# Patient Record
Sex: FEMALE | Race: WHITE | Employment: OTHER | URBAN - METROPOLITAN AREA
[De-identification: names, ages, dates, MRNs, and addresses within clinical notes are randomized per-mention and may not be internally consistent; named-entity substitution may affect disease eponyms.]

---

## 2022-04-17 ENCOUNTER — APPOINTMENT (OUTPATIENT)
Dept: ULTRASOUND IMAGING | Age: 32
End: 2022-04-17
Payer: MEDICAID

## 2022-04-17 ENCOUNTER — HOSPITAL ENCOUNTER (EMERGENCY)
Age: 32
Discharge: HOME | End: 2022-04-17
Attending: EMERGENCY MEDICINE
Payer: MEDICAID

## 2022-04-17 ENCOUNTER — APPOINTMENT (OUTPATIENT)
Dept: CT IMAGING | Age: 32
End: 2022-04-17
Payer: MEDICAID

## 2022-04-17 VITALS
WEIGHT: 114 LBS | HEART RATE: 106 BPM | SYSTOLIC BLOOD PRESSURE: 104 MMHG | OXYGEN SATURATION: 100 % | TEMPERATURE: 98.5 F | DIASTOLIC BLOOD PRESSURE: 76 MMHG | RESPIRATION RATE: 18 BRPM | BODY MASS INDEX: 18.99 KG/M2 | HEIGHT: 65 IN

## 2022-04-17 DIAGNOSIS — R10.30 LOWER ABDOMINAL PAIN: Primary | ICD-10-CM

## 2022-04-17 LAB
A/G RATIO: 2.5 (ref 1.1–2.2)
ALBUMIN SERPL-MCNC: 4.8 G/DL (ref 3.4–5)
ALP BLD-CCNC: 74 U/L (ref 40–129)
ALT SERPL-CCNC: 11 U/L (ref 10–40)
ANION GAP SERPL CALCULATED.3IONS-SCNC: 11 MMOL/L (ref 3–16)
AST SERPL-CCNC: 12 U/L (ref 15–37)
BACTERIA: ABNORMAL /HPF
BASOPHILS ABSOLUTE: 0 K/UL (ref 0–0.2)
BASOPHILS RELATIVE PERCENT: 0.2 %
BILIRUB SERPL-MCNC: 0.4 MG/DL (ref 0–1)
BILIRUBIN URINE: NEGATIVE
BLOOD, URINE: NEGATIVE
BUN BLDV-MCNC: 9 MG/DL (ref 7–20)
CALCIUM SERPL-MCNC: 9.1 MG/DL (ref 8.3–10.6)
CHLORIDE BLD-SCNC: 103 MMOL/L (ref 99–110)
CLARITY: CLEAR
CO2: 24 MMOL/L (ref 21–32)
COLOR: YELLOW
CREAT SERPL-MCNC: 0.6 MG/DL (ref 0.6–1.1)
EOSINOPHILS ABSOLUTE: 0 K/UL (ref 0–0.6)
EOSINOPHILS RELATIVE PERCENT: 0.5 %
EPITHELIAL CELLS, UA: ABNORMAL /HPF (ref 0–5)
GFR AFRICAN AMERICAN: >60
GFR NON-AFRICAN AMERICAN: >60
GLUCOSE BLD-MCNC: 112 MG/DL (ref 70–99)
GLUCOSE URINE: NEGATIVE MG/DL
HCG(URINE) PREGNANCY TEST: NEGATIVE
HCT VFR BLD CALC: 40.5 % (ref 36–48)
HEMOGLOBIN: 13.5 G/DL (ref 12–16)
INFLUENZA A: NOT DETECTED
INFLUENZA B: NOT DETECTED
KETONES, URINE: NEGATIVE MG/DL
LACTIC ACID, SEPSIS: 0.8 MMOL/L (ref 0.4–1.9)
LEUKOCYTE ESTERASE, URINE: NEGATIVE
LYMPHOCYTES ABSOLUTE: 0.3 K/UL (ref 1–5.1)
LYMPHOCYTES RELATIVE PERCENT: 5.4 %
MCH RBC QN AUTO: 27.7 PG (ref 26–34)
MCHC RBC AUTO-ENTMCNC: 33.2 G/DL (ref 31–36)
MCV RBC AUTO: 83.5 FL (ref 80–100)
MICROSCOPIC EXAMINATION: NORMAL
MONOCYTES ABSOLUTE: 0.1 K/UL (ref 0–1.3)
MONOCYTES RELATIVE PERCENT: 2.4 %
NEUTROPHILS ABSOLUTE: 4.9 K/UL (ref 1.7–7.7)
NEUTROPHILS RELATIVE PERCENT: 91.5 %
NITRITE, URINE: NEGATIVE
PDW BLD-RTO: 13.3 % (ref 12.4–15.4)
PH UA: 6.5 (ref 5–8)
PLATELET # BLD: 150 K/UL (ref 135–450)
PMV BLD AUTO: 7.8 FL (ref 5–10.5)
POTASSIUM REFLEX MAGNESIUM: 4.2 MMOL/L (ref 3.5–5.1)
PROTEIN UA: NEGATIVE MG/DL
RBC # BLD: 4.85 M/UL (ref 4–5.2)
RBC UA: ABNORMAL /HPF (ref 0–4)
SARS-COV-2 RNA, RT PCR: NOT DETECTED
SODIUM BLD-SCNC: 138 MMOL/L (ref 136–145)
SPECIFIC GRAVITY UA: <=1.005 (ref 1–1.03)
TOTAL PROTEIN: 6.7 G/DL (ref 6.4–8.2)
URINE REFLEX TO CULTURE: NORMAL
URINE TYPE: ABNORMAL
URINE TYPE: NORMAL
UROBILINOGEN, URINE: 0.2 E.U./DL
WBC # BLD: 5.4 K/UL (ref 4–11)
WBC UA: ABNORMAL /HPF (ref 0–5)

## 2022-04-17 PROCEDURE — 74177 CT ABD & PELVIS W/CONTRAST: CPT

## 2022-04-17 PROCEDURE — 85025 COMPLETE CBC W/AUTO DIFF WBC: CPT

## 2022-04-17 PROCEDURE — 87636 SARSCOV2 & INF A&B AMP PRB: CPT

## 2022-04-17 PROCEDURE — 81001 URINALYSIS AUTO W/SCOPE: CPT

## 2022-04-17 PROCEDURE — 2580000003 HC RX 258: Performed by: EMERGENCY MEDICINE

## 2022-04-17 PROCEDURE — 83605 ASSAY OF LACTIC ACID: CPT

## 2022-04-17 PROCEDURE — 99235 HOSP IP/OBS SAME DATE MOD 70: CPT | Performed by: INTERNAL MEDICINE

## 2022-04-17 PROCEDURE — 76830 TRANSVAGINAL US NON-OB: CPT

## 2022-04-17 PROCEDURE — 84703 CHORIONIC GONADOTROPIN ASSAY: CPT

## 2022-04-17 PROCEDURE — 6360000002 HC RX W HCPCS: Performed by: EMERGENCY MEDICINE

## 2022-04-17 PROCEDURE — 87077 CULTURE AEROBIC IDENTIFY: CPT

## 2022-04-17 PROCEDURE — 96375 TX/PRO/DX INJ NEW DRUG ADDON: CPT

## 2022-04-17 PROCEDURE — 87186 SC STD MICRODIL/AGAR DIL: CPT

## 2022-04-17 PROCEDURE — 36415 COLL VENOUS BLD VENIPUNCTURE: CPT

## 2022-04-17 PROCEDURE — 80053 COMPREHEN METABOLIC PANEL: CPT

## 2022-04-17 PROCEDURE — 96374 THER/PROPH/DIAG INJ IV PUSH: CPT

## 2022-04-17 PROCEDURE — 99285 EMERGENCY DEPT VISIT HI MDM: CPT

## 2022-04-17 PROCEDURE — 6360000004 HC RX CONTRAST MEDICATION: Performed by: EMERGENCY MEDICINE

## 2022-04-17 PROCEDURE — 87086 URINE CULTURE/COLONY COUNT: CPT

## 2022-04-17 RX ORDER — GABAPENTIN 100 MG/1
100 CAPSULE ORAL 3 TIMES DAILY
COMMUNITY

## 2022-04-17 RX ORDER — 0.9 % SODIUM CHLORIDE 0.9 %
1000 INTRAVENOUS SOLUTION INTRAVENOUS ONCE
Status: COMPLETED | OUTPATIENT
Start: 2022-04-17 | End: 2022-04-17

## 2022-04-17 RX ORDER — CYCLOBENZAPRINE HCL 10 MG
10 TABLET ORAL 3 TIMES DAILY PRN
COMMUNITY

## 2022-04-17 RX ORDER — BISACODYL 5 MG
10 TABLET, DELAYED RELEASE (ENTERIC COATED) ORAL DAILY PRN
Qty: 30 TABLET | Refills: 0 | Status: SHIPPED | OUTPATIENT
Start: 2022-04-17

## 2022-04-17 RX ORDER — POLYETHYLENE GLYCOL 3350 17 G/17G
17 POWDER, FOR SOLUTION ORAL 2 TIMES DAILY
Qty: 1530 G | Refills: 1 | Status: SHIPPED | OUTPATIENT
Start: 2022-04-17 | End: 2022-04-24

## 2022-04-17 RX ORDER — DEXMETHYLPHENIDATE HYDROCHLORIDE 5 MG/1
5 TABLET ORAL 2 TIMES DAILY
COMMUNITY

## 2022-04-17 RX ORDER — DIPHENHYDRAMINE HYDROCHLORIDE 50 MG/ML
50 INJECTION INTRAMUSCULAR; INTRAVENOUS ONCE
Status: COMPLETED | OUTPATIENT
Start: 2022-04-17 | End: 2022-04-17

## 2022-04-17 RX ORDER — METOCLOPRAMIDE HYDROCHLORIDE 5 MG/ML
10 INJECTION INTRAMUSCULAR; INTRAVENOUS ONCE
Status: COMPLETED | OUTPATIENT
Start: 2022-04-17 | End: 2022-04-17

## 2022-04-17 RX ORDER — DOCUSATE SODIUM 100 MG/1
100 CAPSULE, LIQUID FILLED ORAL 2 TIMES DAILY
Qty: 60 CAPSULE | Refills: 0 | Status: SHIPPED | OUTPATIENT
Start: 2022-04-17 | End: 2022-04-24

## 2022-04-17 RX ORDER — ONDANSETRON 2 MG/ML
4 INJECTION INTRAMUSCULAR; INTRAVENOUS EVERY 6 HOURS PRN
Status: DISCONTINUED | OUTPATIENT
Start: 2022-04-17 | End: 2022-04-17 | Stop reason: HOSPADM

## 2022-04-17 RX ORDER — FENTANYL CITRATE 50 UG/ML
50 INJECTION, SOLUTION INTRAMUSCULAR; INTRAVENOUS
Status: DISCONTINUED | OUTPATIENT
Start: 2022-04-17 | End: 2022-04-17 | Stop reason: HOSPADM

## 2022-04-17 RX ORDER — TRAMADOL HYDROCHLORIDE 50 MG/1
50 TABLET ORAL EVERY 6 HOURS PRN
COMMUNITY

## 2022-04-17 RX ADMIN — ONDANSETRON HYDROCHLORIDE 4 MG: 2 INJECTION, SOLUTION INTRAMUSCULAR; INTRAVENOUS at 09:21

## 2022-04-17 RX ADMIN — HYDROMORPHONE HYDROCHLORIDE 0.5 MG: 1 INJECTION, SOLUTION INTRAMUSCULAR; INTRAVENOUS; SUBCUTANEOUS at 10:48

## 2022-04-17 RX ADMIN — SODIUM CHLORIDE 1000 ML: 9 INJECTION, SOLUTION INTRAVENOUS at 13:53

## 2022-04-17 RX ADMIN — METOCLOPRAMIDE 10 MG: 5 INJECTION, SOLUTION INTRAMUSCULAR; INTRAVENOUS at 10:48

## 2022-04-17 RX ADMIN — DIPHENHYDRAMINE HYDROCHLORIDE 50 MG: 50 INJECTION, SOLUTION INTRAMUSCULAR; INTRAVENOUS at 10:48

## 2022-04-17 RX ADMIN — SODIUM CHLORIDE 1000 ML: 9 INJECTION, SOLUTION INTRAVENOUS at 09:21

## 2022-04-17 RX ADMIN — IOPAMIDOL 75 ML: 755 INJECTION, SOLUTION INTRAVENOUS at 09:35

## 2022-04-17 ASSESSMENT — PAIN DESCRIPTION - PAIN TYPE: TYPE: ACUTE PAIN

## 2022-04-17 ASSESSMENT — PAIN SCALES - GENERAL
PAINLEVEL_OUTOF10: 4
PAINLEVEL_OUTOF10: 7

## 2022-04-17 NOTE — LETTER
[facility] was seen and treated in our emergency department on 4/17/2022. She may return to work on 04/19/2022. If you have any questions or concerns, please don't hesitate to call.       Fiorella Hendrix MD

## 2022-04-17 NOTE — ED PROVIDER NOTES
[address] ED      CHIEF COMPLAINT  Abdominal Pain (X1 DAY) and Flank Pain (X DAY)       HISTORY OF PRESENT ILLNESS  Chelsea Deliliah Fenton Severance is a 28 y.o. female with history of brain tumor, POTS, ADHD who is visiting from [place] who comes to the emergency department for evaluation of abdominal pain. Patient reports she started having right-sided flank pain radiating down starting yesterday. Says she contacted her PCP who recommended that she come in for urine. Says she did not have time to stop by before Taiwo Mckeon catch her flight. Says today, the pain got acutely worse. Reports having a odor in her urine. Denies having dysuria or urinary urgency. No blood in the urine. Says she did have blood before her. .  Not currently. Says she always feels nauseous and tried her home Zofran 8 mg ODT with no improvement of symptoms. Rates her pain as a 9-10 out of 10 sharp pain to the right abdomen. No other complaints, modifying factors or associated symptoms. I have reviewed the following from the nursing documentation. Past Medical History:   Diagnosis Date    ADHD (attention deficit hyperactivity disorder)     Anxiety     Asthma     Benign tumor of breast     Brain tumor ([facility] [address]     Depression     Heart murmur     POTS (postural orthostatic tachycardia syndrome)     Tumor, foot     left ankle    Unspecified breast disorder     tumor removed in Dec 2012     Past Surgical History:   Procedure Laterality Date    BREAST ENHANCEMENT SURGERY      BREAST SURGERY      2 cysts removed     SECTION      COLONOSCOPY      EXTERNAL AUDITORY CANAL RECONSTRUCTION      OTHER SURGICAL HISTORY      spinal tap    OTHER SURGICAL HISTORY      blood patch     UPPER GASTROINTESTINAL ENDOSCOPY      WISDOM TOOTH EXTRACTION       History reviewed. No pertinent family history.   Social History     Socioeconomic History    Marital status: Single     Spouse name: Not on file    Number of children: Not on file    Years of education: Not on file    Highest education level: Not on file   Occupational History    Not on file   Tobacco Use    Smoking status: Former Smoker     Types: Cigarettes     Quit date: 2016     Years since quittin.0    Smokeless tobacco: Never Used   Substance and Sexual Activity    Alcohol use: No     Comment: 2-3 times a year    Drug use: No    Sexual activity: Yes     Partners: Male   Other Topics Concern    Not on file   Social History Narrative    Not on file     Social Determinants of Health     Financial Resource Strain:     Difficulty of Paying Living Expenses: Not on file   Food Insecurity:     Worried About Running Out of Food in the Last Year: Not on file    Antionette of Food in the Last Year: Not on file   Transportation Needs:     Lack of Transportation (Medical): Not on file    Lack of Transportation (Non-Medical): Not on file   Physical Activity:     Days of Exercise per Week: Not on file    Minutes of Exercise per Session: Not on file   Stress:     Feeling of Stress : Not on file   Social Connections:     Frequency of Communication with Friends and Family: Not on file    Frequency of Social Gatherings with Friends and Family: Not on file    Attends Quaker Services: Not on file    Active Member of [address] [place] [address] [employer] or Organizations: Not on file    Attends Club or Organization Meetings: Not on file    Marital Status: Not on file   Intimate Partner Violence:     Fear of Current or Ex-Partner: Not on file    Emotionally Abused: Not on file    Physically Abused: Not on file    Sexually Abused: Not on file   Housing Stability:     Unable to Pay for Housing in the Last Year: Not on file    Number of [place] in the Last Year: Not on file    Unstable Housing in the Last Year: Not on file     No current facility-administered medications for this encounter.      Current Outpatient Medications   Medication Sig Dispense Refill    gabapentin (NEURONTIN) 100 MG capsule Take 100 mg by mouth 3 times daily.  traMADol (ULTRAM) 50 MG tablet Take 50 mg by mouth every 6 hours as needed for Pain.  cyclobenzaprine (FLEXERIL) 10 MG tablet Take 10 mg by mouth 3 times daily as needed for Muscle spasms      dexmethylphenidate (FOCALIN) 5 MG tablet Take 5 mg by mouth 2 times daily.  polyethylene glycol (GLYCOLAX) 17 GM/SCOOP powder Take 17 g by mouth 2 times daily for 7 days 1530 g 1    docusate sodium (COLACE) 100 MG capsule Take 1 capsule by mouth 2 times daily for 7 days 60 capsule 0    bisacodyl (BISACODYL) 5 MG EC tablet Take 2 tablets by mouth daily as needed for Constipation 30 tablet 0    amphetamine-dextroamphetamine (ADDERALL) 10 MG tablet Take 10 mg by mouth 2 times daily       Allergies   Allergen Reactions    Amoxicillin Hives    Penicillins      Severe hives       REVIEW OF SYSTEMS  10 systems reviewed, pertinent positives per HPI otherwise noted to be negative. PHYSICAL EXAM  /76   Pulse 106   Temp 98.5 °F (36.9 °C) (Oral)   Resp 18   Ht 5' 5\" (1.651 m)   Wt 114 lb (51.7 kg)   LMP 04/12/2022   SpO2 100%   BMI 18.97 kg/m²    GENERAL APPEARANCE: Awake and alert. In moderate distress due to pain. Laying in a fetal position. HENT: Normocephalic. Atraumatic. No facial droop. HEART/CHEST: Normotensive. Tachycardic to 117. LUNGS: Respirations unlabored. Speaking comfortably in full sentences. Clear to auscultation bilaterally. ABDOMEN: Soft, non-distended abdomen. tenderness to palpation over the RLQ. No guarding. No rebound. EXTREMITIES: No gross deformities. Moving all extremities. SKIN: Warm and dry. No acute rashes. NEUROLOGICAL: Alert and oriented. No gross facial drooping. Answering questions appropriately. Moving all extremities. PSYCHIATRIC: Pleasant. Normal mood and affect.     LABS  Results for orders placed or performed during the hospital encounter of 04/17/22   COVID-19 & Influenza Combo Specimen: Nasopharyngeal Swab   Result Value Ref Range    SARS-CoV-2 RNA, RT PCR NOT DETECTED NOT DETECTED    INFLUENZA A NOT DETECTED NOT DETECTED    INFLUENZA B NOT DETECTED NOT DETECTED   CBC with Auto Differential   Result Value Ref Range    WBC 5.4 4.0 - 11.0 K/uL    RBC 4.85 4.00 - 5.20 M/uL    Hemoglobin 13.5 12.0 - 16.0 g/dL    Hematocrit 40.5 36.0 - 48.0 %    MCV 83.5 80.0 - 100.0 fL    MCH 27.7 26.0 - 34.0 pg    MCHC 33.2 31.0 - 36.0 g/dL    RDW 13.3 12.4 - 15.4 %    Platelets 936 828 - 693 K/uL    MPV 7.8 5.0 - 10.5 fL    Neutrophils % 91.5 %    Lymphocytes % 5.4 %    Monocytes % 2.4 %    Eosinophils % 0.5 %    Basophils % 0.2 %    Neutrophils Absolute 4.9 1.7 - 7.7 K/uL    Lymphocytes Absolute 0.3 (L) 1.0 - 5.1 K/uL    Monocytes Absolute 0.1 0.0 - 1.3 K/uL    Eosinophils Absolute 0.0 0.0 - 0.6 K/uL    Basophils Absolute 0.0 0.0 - 0.2 K/uL   Comprehensive Metabolic Panel w/ Reflex to MG   Result Value Ref Range    Sodium 138 136 - 145 mmol/L    Potassium reflex Magnesium 4.2 3.5 - 5.1 mmol/L    Chloride 103 99 - 110 mmol/L    CO2 24 21 - 32 mmol/L    Anion Gap 11 3 - 16    Glucose 112 (H) 70 - 99 mg/dL    BUN 9 7 - 20 mg/dL    CREATININE 0.6 0.6 - 1.1 mg/dL    GFR Non-African American >60 >60    GFR African American >60 >60    Calcium 9.1 8.3 - 10.6 mg/dL    Total Protein 6.7 6.4 - 8.2 g/dL    Albumin 4.8 3.4 - 5.0 g/dL    Albumin/Globulin Ratio 2.5 (H) 1.1 - 2.2    Total Bilirubin 0.4 0.0 - 1.0 mg/dL    Alkaline Phosphatase 74 40 - 129 U/L    ALT 11 10 - 40 U/L    AST 12 (L) 15 - 37 U/L   Lactate, Sepsis   Result Value Ref Range    Lactic Acid, Sepsis 0.8 0.4 - 1.9 mmol/L   Urinalysis with Reflex to Culture    Specimen: Urine, clean catch   Result Value Ref Range    Color, UA Yellow Straw/Yellow    Clarity, UA Clear Clear    Glucose, Ur Negative Negative mg/dL    Bilirubin Urine Negative Negative    Ketones, Urine Negative Negative mg/dL    Specific Gravity, UA <=1.005 1.005 - 1.030    Blood, Urine Negative Negative    pH, UA 6.5 5.0 - 8.0    Protein, UA Negative Negative mg/dL    Urobilinogen, Urine 0.2 <2.0 E.U./dL    Nitrite, Urine Negative Negative    Leukocyte Esterase, Urine Negative Negative    Microscopic Examination Not Indicated     Urine Type NotGiven     Urine Reflex to Culture Not Indicated    Pregnancy, Urine   Result Value Ref Range    HCG(Urine) Pregnancy Test Negative Detects HCG level >20 MIU/mL   Microscopic Urinalysis   Result Value Ref Range    WBC, UA 0-2 0 - 5 /HPF    RBC, UA 0-2 0 - 4 /HPF    Epithelial Cells, UA 2-5 0 - 5 /HPF    Bacteria, UA 4+ (A) None Seen /HPF    Urine Type NotGiven        I have reviewed all labs for this visit. RADIOLOGY  US NON OB TRANSVAGINAL    Result Date: 4/17/2022  EXAMINATION: PELVIC ULTRASOUND 4/17/2022 TECHNIQUE: Endovaginal sonographic examination of the pelvis was performed with color Doppler imaging. COMPARISON: None HISTORY: ORDERING SYSTEM PROVIDED HISTORY: severe bilateral lower abdominal pain. assess for torsion TECHNOLOGIST PROVIDED HISTORY: Reason for exam:->severe bilateral lower abdominal pain. assess for torsion FINDINGS: Measurements: Uterus: 7.8 x 4.7 x 4 cm Endometrial stripe: 7 mm Right Ovary:2.7 x 1.2 x 1.6 cm Left Ovary: 2.8 x 1.6 x 1.6 cm Ultrasound Findings: Uterus: Uterus demonstrates normal myometrial echotexture. Note is made of a tiny 10 mm intramural echogenic fundal fibroid. Endometrial stripe: Endometrial stripe is within normal limits. Right Ovary: Right ovary is within normal limits. There is normal Doppler flow. Left Ovary:  Left ovary is within normal limits. There is normal Doppler flow. Free Fluid: A small amount of physiologic fluid is present within the cul-de-sac. No acute abnormality.  RECOMMENDATIONS: Unavailable     CT ABDOMEN PELVIS W IV CONTRAST Additional Contrast? None    Result Date: 4/17/2022  EXAM: CT Abdomen and Pelvis With Intravenous Contrast EXAM DATE/TIME: 4/17/2022 9:27 am CLINICAL HISTORY: ORDERING SYSTEM PROVIDED  right flank pain radiating down. also having left sided flank pain. TECHNOLOGIST PROVIDED HISTORY:  Reason for exam:->right flank pain radiating down. also having left sided flank pain. Additional Contrast?->None  Decision Support Exception - unselect if not a suspected or confirmed emergency medical condition->Emergency Medical Condition (MA) Reason for Exam: rt sided lower abd/pelvic pain    started late last night with vomiting and fever  Relevant Medical/Surgical History: no prior abd sx to note TECHNIQUE: Axial computed tomography images of the abdomen and pelvis with intravenous contrast.  This CT exam was performed using one or more of the following dose reduction techniques:  automated exposure control, adjustment of the mA and/or kV according to patient size, and/or use of iterative reconstruction technique. COMPARISON: No relevant prior studies available. FINDINGS: Lung bases:  No acute findings. No mass. No consolidation. ABDOMEN: Liver:  No acute findings. No mass. Gallbladder and bile ducts:  No acute findings. No calcified stones. No ductal dilation. Pancreas:  No acute findings. No mass. No ductal dilation. Spleen:  No acute findings. No splenomegaly. Adrenals:  No acute findings. No mass. Kidneys and ureters:  No acute findings. No hydronephrosis. Tiny bilateral renal cysts. Stomach and bowel:  Degree of right through proximal sigmoid colon constipation but otherwise nonspecific bowel gas pattern. No obstruction. No mucosal thickening. PELVIS: Appendix:  A definite appendix is not identified but no evidence of pericecal inflammation. Bladder:  No acute findings. No mass. Reproductive:  Unremarkable as visualized. ABDOMEN and PELVIS: Intraperitoneal space:  Small amount of right posterior pelvic fluid is noted. No free air. Bones/joints:  No acute fracture. No dislocation. Soft tissues:  No acute findings. Vasculature:  No acute findings.   No abdominal aortic aneurysm. Lymph nodes:  No acute findings. No enlarged lymph nodes. 1.  Small amount of right posterior pelvic fluid is noted. 2.  A definite appendix is not identified but no evidence of pericecal inflammation. 3.  Degree of right through proximal sigmoid colon constipation but otherwise nonspecific bowel gas pattern. ED COURSE/MDM  Patient seen and evaluated. At presentation, patient was awake, alert, afebrile, borderline hypotensive, tachycardic to 117, and satting 100% on room air. She had soft, nondistended abdomen with tenderness to palpation over the right lower abdomen. She had bilateral costovertebral tenderness. Differential diagnosis includes pyelonephritis, UTI, nephrolithiasis, acute pancreatitis, acute appendicitis, or others. Labs, CT abdomen pelvis, and urine obtained. She was given fentanyl and 1 L IV fluid bolus for symptoms. She was given IV Zofran. She reports having persistent nausea and pain after receiving fentanyl and Zofran. She was ordered Benadryl, Reglan, and Dilaudid. Patient continuing to have pain after fentanyl and Dilaudid. Discussed the results of the work-up including the ultrasound and CT scan. Due to the persistent pain, hospitalist consulted for admission for further evaluation and treatment. Second liter of IVF bolus ordered for the patient. Patient evaluated in the emergency department by the hospitalist and hospitalist please order for discharge for the patient. Pt was seen during the [place] 19 pandemic. Appropriate PPE worn by ME during patient encounters. Patient was cared for during a time with constrained hospital bed capacity with nationwide stress on resources and staffing.      During the patient's ED course, the patient was given:  Medications   0.9 % sodium chloride bolus (0 mLs IntraVENous Stopped 4/17/22 4792)   iopamidol (ISOVUE-370) 76 % injection 75 mL (75 mLs IntraVENous Given 4/17/22 4843)   HYDROmorphone (DILAUDID) injection 0.5 mg (0.5 mg IntraVENous Given 4/17/22 1048)   metoclopramide (REGLAN) injection 10 mg (10 mg IntraVENous Given 4/17/22 1048)   diphenhydrAMINE (BENADRYL) injection 50 mg (50 mg IntraVENous Given 4/17/22 1048)   0.9 % sodium chloride bolus (0 mLs IntraVENous Stopped 4/17/22 1459)        CLINICAL IMPRESSION  1. Lower abdominal pain        Blood pressure 104/76, pulse 106, temperature 98.5 °F (36.9 °C), temperature source Oral, resp. rate 18, height 5' 5\" (1.651 m), weight 114 lb (51.7 kg), last menstrual period 04/12/2022, SpO2 100 %. Bernie Koenig was discharged home in stable condition. Patient was given scripts for the following medications. I counseled patient how to take these medications. Discharge Medication List as of 4/17/2022  2:51 PM      START taking these medications    Details   polyethylene glycol (GLYCOLAX) 17 GM/SCOOP powder Take 17 g by mouth 2 times daily for 7 days, Disp-1530 g, R-1Print      docusate sodium (COLACE) 100 MG capsule Take 1 capsule by mouth 2 times daily for 7 days, Disp-60 capsule, R-0Print      bisacodyl (BISACODYL) 5 MG EC tablet Take 2 tablets by mouth daily as needed for Constipation, Disp-30 tablet, R-0Print             Follow-up with:  Lashanda Aceves MD  [address]  [phone]    In 2 days        DISCLAIMER: This chart was created using Dragon dictation software. Efforts were made by me to ensure accuracy, however some errors may be present due to limitations of this technology and occasionally words are not transcribed correctly.         Deanne Ding MD  04/18/22 8430

## 2022-04-17 NOTE — CONSULTS
Hospital Medicine History & Physical      PCP: Diann Mitchell MD    Date of Admission: 4/17/2022    Date of Service: Pt seen/examined on 4/17/2022 in consultation with ED team.       Chief Complaint:  abd pain. History Of Present Illness: The patient is a pleasant 28 y.o. female w hx of chronic pain has been on tramadol for over a year, hx of POTS syndrome, Benign brain tumor, ADHD, who presented to ED today with abd pain. Pt reports pain started last night and gradually worsened to become severe. She had single episode of non bloody non bilious emesis. She reports pain localizes to RLQ and radiates across her lower abdomen and also to the back. She admits she has not taken her miralax in the past few days and has not had a good BM in the past few days. She denies any fever, no skin rash. Of note pt just had an extensive evaluation in [place] with MRI C T and L spine as she was having issues with her R leg \"giving out\" resulting in a fall - all 3 MRIs turned out fairly unremarkable. She is scheduled to follow up with her neurosurgeon to discuss the results. She does not feel her abdominal symptoms are same or related to her back problem. ED work up has been reassuring with normal blood work. CT abd pelvis shows small amount of right post pelvic fluid. Appendix not visible but no pericecal inflammation evident. Positive for right and all the way through proximal sigmoid colon constipation. Otherwise unremarkable evaluation. Trans Vaginal US was also performed and revealed no acute abnormality.              Past Medical History:        Diagnosis Date    ADHD (attention deficit hyperactivity disorder)     Anxiety     Asthma     Benign tumor of breast     Brain tumor (HCC)     Depression     Heart murmur     POTS (postural orthostatic tachycardia syndrome)     Tumor, foot     left ankle    Unspecified breast disorder     tumor removed in Dec 2012       Past Surgical History:        Procedure Laterality Date    BREAST ENHANCEMENT SURGERY      BREAST SURGERY      2 cysts removed     SECTION      COLONOSCOPY      EXTERNAL AUDITORY CANAL RECONSTRUCTION      OTHER SURGICAL HISTORY      spinal tap    OTHER SURGICAL HISTORY      blood patch     UPPER GASTROINTESTINAL ENDOSCOPY      WISDOM TOOTH EXTRACTION         Medications Prior to Admission:    Prior to Admission medications    Medication Sig Start Date End Date Taking? Authorizing Provider   gabapentin (NEURONTIN) 100 MG capsule Take 100 mg by mouth 3 times daily. Yes Historical Provider, MD   traMADol (ULTRAM) 50 MG tablet Take 50 mg by mouth every 6 hours as needed for Pain. Yes Historical Provider, MD   cyclobenzaprine (FLEXERIL) 10 MG tablet Take 10 mg by mouth 3 times daily as needed for Muscle spasms   Yes Historical Provider, MD   dexmethylphenidate (FOCALIN) 5 MG tablet Take 5 mg by mouth 2 times daily. Yes Historical Provider, MD   polyethylene glycol (GLYCOLAX) 17 GM/SCOOP powder Take 17 g by mouth 2 times daily for 7 days 22 Yes Wolfgang Hernandez MD   docusate sodium (COLACE) 100 MG capsule Take 1 capsule by mouth 2 times daily for 7 days 22 Yes Wolfgang Hernandez MD   bisacodyl (BISACODYL) 5 MG EC tablet Take 2 tablets by mouth daily as needed for Constipation 22  Yes Wolfgang Hernandez MD   amphetamine-dextroamphetamine (ADDERALL) 10 MG tablet Take 10 mg by mouth 2 times daily    Historical Provider, MD       Allergies:  Amoxicillin and Penicillins    Social History:  The patient currently lives at home Visiting from [place] - staying with parents in [facility]. TOBACCO:   reports that she quit smoking about 6 years ago. Her smoking use included cigarettes.  She has never used smokeless tobacco.  ETOH:   reports no history of alcohol use. Family History:  Reviewed in detail and negative for DM, Early CAD, Cancer, CVA. Positive as follows:    History reviewed. No pertinent family history. REVIEW OF SYSTEMS:   As noted in the HPI. All other systems reviewed and negative. PHYSICAL EXAM:    /81   Pulse 114   Temp 98.5 °F (36.9 °C) (Oral)   Resp 18   Ht 5' 5\" (1.651 m)   Wt 114 lb (51.7 kg)   LMP 04/12/2022   SpO2 99%   BMI 18.97 kg/m²     General appearance: No apparent distress appears stated age and cooperative. HEENT Normal cephalic, atraumatic without obvious deformity. Pupils equal, round, and reactive to light. Extra ocular muscles intact. Conjunctivae/corneas clear. Neck: Supple, No jugular venous distention/bruits. Trachea midline without thyromegaly or adenopathy with full range of motion. Lungs: Clear to auscultation, bilaterally without Rales/Wheezes/Rhonchi with good respiratory effort. Heart: Regular rate and rhythm with Normal S1/S2 without murmurs, rubs or gallops, point of maximum impulse non-displaced  Abdomen: Soft, + BS. Lower abdominal and suprapubic tenderness. No rebound, no rigidity. Extremities: No clubbing, cyanosis, or edema bilaterally. Full range of motion without deformity and normal gait intact. Skin: Skin color, texture, turgor normal.  No rashes or lesions. Neurologic: Alert and oriented X 3, neurovascularly intact with sensory/motor intact upper extremities/lower extremities, bilaterally. Cranial nerves: II-XII intact, grossly non-focal.  Mental status: Alert, oriented, thought content appropriate.   Capillary Refill: Acceptable  < 3 seconds  Peripheral Pulses: +3 Easily felt, not easily obliterated with pressure      CBC   Recent Labs     04/17/22  0855   WBC 5.4   HGB 13.5   HCT 40.5         RENAL  Recent Labs     04/17/22  0855      K 4.2      CO2 24   BUN 9   CREATININE 0.6     LFT'S  Recent Labs     04/17/22  0855   AST 12*   ALT 11 BILITOT 0.4   ALKPHOS 74     COAG  No results for input(s): INR in the last 72 hours. CARDIAC ENZYMES  No results for input(s): CKTOTAL, CKMB, CKMBINDEX, TROPONINI in the last 72 hours. U/A:    Lab Results   Component Value Date    COLORU Yellow 04/17/2022    WBCUA 0-2 04/17/2022    RBCUA 0-2 04/17/2022    MUCUS 1+ 10/24/2016    BACTERIA 4+ 04/17/2022    CLARITYU Clear 04/17/2022    SPECGRAV <=1.005 04/17/2022    LEUKOCYTESUR Negative 04/17/2022    BLOODU Negative 04/17/2022    GLUCOSEU Negative 04/17/2022       ABG  No results found for: MGE7HLO, BEART, S1XEFLIK, PHART, THGBART, GRI9YNP, PO2ART, FIW5COD        There are no active hospital problems to display for this patient. ASSESSMENT/PLAN:      Abd pain. Lab work, CT and Trans Vaginal Ultrasound all reassuring. UA and HcG negative. Suspect pain is secondary to severe constipation likely related to chronic tramadol use. CT showed constipation from right colon all the way to proximal sigmoid. Will start pt on PO bowel regimen with    - BID miralax. - BID colace. - PRN dulcolax  Stable for discharge home with family support. Advised to return to ED if fails to have improvement in bowel function over the next 48hrs. Dispo - home in stable condition. Shelly Peace MD    Thank you Alan Greenberg MD for the opportunity to be involved in this patient's care. If you have any questions or concerns please feel free to contact me at [phone].

## 2022-04-17 NOTE — ED NOTES
[id number]- Dr. Kellen Baez spoke to Mairela SIMONS regarding admission      [facility]  04/17/22 3722

## 2022-04-19 LAB
ORGANISM: ABNORMAL
URINE CULTURE, ROUTINE: ABNORMAL

## 2022-07-09 ENCOUNTER — TELEPHONE ENCOUNTER (OUTPATIENT)
Dept: URBAN - METROPOLITAN AREA CLINIC 121 | Facility: CLINIC | Age: 32
End: 2022-07-09

## 2022-07-10 ENCOUNTER — TELEPHONE ENCOUNTER (OUTPATIENT)
Dept: URBAN - METROPOLITAN AREA CLINIC 121 | Facility: CLINIC | Age: 32
End: 2022-07-10

## 2022-07-10 RX ORDER — GABAPENTIN 300 MG
CAPSULE ORAL TAKE AS DIRECTED
Refills: 0 | Status: ACTIVE | COMMUNITY
Start: 2011-03-28

## 2022-07-10 RX ORDER — CITALOPRAM 10 MG/1
TABLET, FILM COATED ORAL TAKE AS DIRECTED
Refills: 0 | Status: ACTIVE | COMMUNITY
Start: 2011-03-28